# Patient Record
Sex: FEMALE | Race: OTHER | URBAN - METROPOLITAN AREA
[De-identification: names, ages, dates, MRNs, and addresses within clinical notes are randomized per-mention and may not be internally consistent; named-entity substitution may affect disease eponyms.]

---

## 2017-09-06 NOTE — PATIENT DISCUSSION
1.  Dry Eyes- use tears2. Disp new brand Air Optix Hydraglyde for more moist and oxygen permeable. New rx for more reading -3.00/-4.75 MONO. Midrange is important. Fit 80. Has eyemed with 10% disc off eval.  Has 105 for materials. 3.  Hx Chalazion4.   RTN   1yr CE  [employer]

## 2018-09-17 NOTE — PATIENT DISCUSSION
1.  Dry Eyes- use tears2. Cont with current brand Air Optix Hydraglyde for more moist and oxygen permeable. No change in rx.  -3.00/-4.75 MONO. Midrange is important. Fit 80. Has eyemed with 10% disc off eval.  Has 150 for materials. Using [id number] for gls 1st progressive. 3.  Hx Chalazion4.   RTN   1yr CE  [employer]

## 2019-06-25 NOTE — PATIENT DISCUSSION
1.  Dry Eyes OU:   Pt c/o heat on side of eye and possibly into cheek not sure if related to sinus'  Pt will start  artificials tears. Encouraged regular use. Pt will call in few days to let us know progress.  2.  RTN 9/19 CE

## 2019-10-28 NOTE — PATIENT DISCUSSION
1.  Dry Eyes- use tears2. Pt would like more readinga nd distance---Disp trials of Air Optix MF -4.50H/-4.50M. Eval 90. Has eyemed with 10% disc off eval.  Has 150 for materials. Using [id number] for gls  Has progressive. 3.  Hx Chalazion4. RTN   1 week clck-- to adjust MF rx5.   RTN  1yr CE  [employer]

## 2019-11-01 NOTE — PATIENT DISCUSSION
1.  Dry Eyes- use tears2. Pt would like more readinga nd distance---Disp trials of anotehr pr of MF's  -4.25H and -4. 75M for more near and more dist.  Pt was given Air Optix MF -4.50H/-4.50M on first visit. Pt will wear 2nd pr for 1 week and if not good will try first pr again for 1 week. Has only worn MF;s for 3 days. Needs to use more tears. Eval 90. Has eyemed with 10% disc off eval.  Has 150 for materials. Using [id number] for gls  Has progressive. 3.  Hx Chalazion4.   RTN  1yr CE  [employer]

## 2020-02-26 ENCOUNTER — IMPORTED ENCOUNTER (OUTPATIENT)
Dept: URBAN - METROPOLITAN AREA CLINIC 9 | Facility: CLINIC | Age: 85
End: 2020-02-26

## 2020-07-28 ENCOUNTER — IMPORTED ENCOUNTER (OUTPATIENT)
Dept: URBAN - METROPOLITAN AREA CLINIC 9 | Facility: CLINIC | Age: 85
End: 2020-07-28

## 2020-10-27 ENCOUNTER — IMPORTED ENCOUNTER (OUTPATIENT)
Dept: URBAN - METROPOLITAN AREA CLINIC 9 | Facility: CLINIC | Age: 85
End: 2020-10-27

## 2020-11-05 NOTE — PATIENT DISCUSSION
1.  Dry Eyes- use tears2. Pt would like more reading----Order trials of   Air Optix Hydraglyde MF  -4.00H and -4. 75M for more near. Needs to use more tears. Eval 120. Has eyemed with 10% disc off eval.  Has 150 for materials. Has progressive. 3.  Hx Chalazion4. LOves dulce 1 st prog last yr--5.   RTN  1yr CE  [employer]

## 2021-02-09 ENCOUNTER — IMPORTED ENCOUNTER (OUTPATIENT)
Dept: URBAN - METROPOLITAN AREA CLINIC 9 | Facility: CLINIC | Age: 86
End: 2021-02-09

## 2021-06-25 NOTE — PATIENT DISCUSSION
1.  INternal HOrdeolum OS LL- RX TDX qid Keflex 500mg bid 14 days. Clean and no makeup-discard old makeup. Tears qid. WC massage to drain. 2. Pt leaving for [facility] today. 3.   RTN 11/21 CE

## 2021-08-13 ENCOUNTER — IMPORTED ENCOUNTER (OUTPATIENT)
Dept: URBAN - METROPOLITAN AREA CLINIC 9 | Facility: CLINIC | Age: 86
End: 2021-08-13

## 2021-08-18 NOTE — PATIENT DISCUSSION
1.  INternal HOrdeolum OD LL- near puncta LL--   Pt has TDX qid Keflex 500mg bid 14 days. Clean and no makeup-discard old makeup. Tears qid. WC massage to drain. 2. Pt leaving for [place] 8/26/21.3.   RTN 11/21 CE

## 2021-10-18 ASSESSMENT — TONOMETRY
OS_IOP_MMHG: 13
OD_IOP_MMHG: 9
OS_IOP_MMHG: 12
OS_IOP_MMHG: 14
OS_IOP_MMHG: 16
OS_IOP_MMHG: 15
OD_IOP_MMHG: 13
OD_IOP_MMHG: 9
OD_IOP_MMHG: 13
OD_IOP_MMHG: 12

## 2021-10-18 ASSESSMENT — VISUAL ACUITY
OD_CC: 20/25 -2 SN
OD_CC: 20/25 SN
OD_CC: 20/25 SN
OD_CC: 20/25 -2 SN
OD_CC: 20/25 -2 SN

## 2021-11-05 NOTE — PATIENT DISCUSSION
1.  Dry Eyes- use tears2. Pt would like more reading----Disp trials of Air Optix Hydraglyde MF  -4. 00H/-4.75M for more near. Gave sample of -4.50M for left eye also to try for more near. Needs to use more tears. Eval 120. Has eyemed with 10% disc off eval.  Has 150 for materials. Has progressive. 3.  Hx Chalazion--keep cleaning 4. LOves her 1 st prog last yr--5.   RTN  1yr CE  [employer]

## 2022-06-20 RX ORDER — BENAZEPRIL HYDROCHLORIDE 10 MG/1
TABLET ORAL
COMMUNITY

## 2022-06-20 RX ORDER — CLONIDINE HYDROCHLORIDE 0.1 MG/1
TABLET ORAL
COMMUNITY

## 2022-06-20 RX ORDER — LEVOTHYROXINE SODIUM 0.07 MG/1
TABLET ORAL
COMMUNITY

## 2022-06-20 RX ORDER — TIMOLOL MALEATE 5 MG/ML
SOLUTION/ DROPS OPHTHALMIC
COMMUNITY

## 2022-06-20 RX ORDER — DILTIAZEM HYDROCHLORIDE 120 MG/1
CAPSULE, COATED, EXTENDED RELEASE ORAL
COMMUNITY

## 2022-06-20 RX ORDER — POTASSIUM CHLORIDE 20 MEQ/1
TABLET, EXTENDED RELEASE ORAL
COMMUNITY

## 2022-06-20 RX ORDER — RALOXIFENE HYDROCHLORIDE 60 MG/1
TABLET, FILM COATED ORAL
COMMUNITY

## 2022-06-20 RX ORDER — ALISKIREN 300 MG/1
TABLET, FILM COATED ORAL
COMMUNITY

## 2022-06-20 RX ORDER — LACTULOSE 10 G/15ML
SOLUTION ORAL
COMMUNITY

## 2022-06-20 RX ORDER — ESCITALOPRAM OXALATE 10 MG/1
TABLET ORAL
COMMUNITY

## 2022-06-20 RX ORDER — METHAZOLAMIDE 50 MG/1
TABLET ORAL
COMMUNITY

## 2022-06-20 RX ORDER — LATANOPROST 50 UG/ML
SOLUTION/ DROPS OPHTHALMIC
COMMUNITY

## 2022-06-20 RX ORDER — BRIMONIDINE TARTRATE 0.1 %
DROPS OPHTHALMIC (EYE)
COMMUNITY

## 2022-06-20 RX ORDER — ATORVASTATIN CALCIUM 10 MG/1
TABLET, FILM COATED ORAL
COMMUNITY

## 2022-11-29 NOTE — PATIENT DISCUSSION
Pt would like more reading----Disp trials of Air Optix Hydraglyde MF  -3.75H/-4.50M for more near.  Needs to use more tears. Eval 145. Has eyemed with 10% disc off eval.  Has 150 for materials.